# Patient Record
(demographics unavailable — no encounter records)

---

## 2024-10-28 NOTE — REASON FOR VISIT
[Initial Evaluation] : an initial evaluation [Hypothyroidism] : hypothyroidism [FreeTextEntry2] : Dr. Nakia Nice

## 2024-10-28 NOTE — CONSULT LETTER
[Dear  ___] : Dear  [unfilled], [Consult Letter:] : I had the pleasure of evaluating your patient, [unfilled]. [Please see my note below.] : Please see my note below. [Consult Closing:] : Thank you very much for allowing me to participate in the care of this patient.  If you have any questions, please do not hesitate to contact me. [Sincerely,] : Sincerely, [FreeTextEntry2] : Dr. Nakia Nice [address] [place] [address] [FreeTextEntry3] : Sae Merino DO Division of [facility]  of Medicine [employer] Director of [facility]

## 2024-10-28 NOTE — PHYSICAL EXAM
[Alert] : alert [Well Nourished] : well nourished [Healthy Appearance] : healthy appearance [No Acute Distress] : no acute distress [Well Developed] : well developed [EOMI] : extra ocular movement intact [Normal Hearing] : hearing was normal [Supple] : the neck was supple [Thyroid Not Enlarged] : the thyroid was not enlarged [No Respiratory Distress] : no respiratory distress [No Accessory Muscle Use] : no accessory muscle use [Normal Rate and Effort] : normal respiratory rate and effort [Clear to Auscultation] : lungs were clear to auscultation bilaterally [Normal S1, S2] : normal S1 and S2 [Normal Rate] : heart rate was normal [Regular Rhythm] : with a regular rhythm [No Edema] : no peripheral edema [Normal Bowel Sounds] : normal bowel sounds [Not Tender] : non-tender [Not Distended] : not distended [Soft] : abdomen soft [No Stigmata of Cushings Syndrome] : no stigmata of Cushings Syndrome [No Involuntary Movements] : no involuntary movements were seen [Oriented x3] : oriented to person, place, and time [Normal Affect] : the affect was normal [Normal Mood] : the mood was normal

## 2024-10-28 NOTE — REVIEW OF SYSTEMS
[Recent Weight Loss (___ Lbs)] : recent weight loss: [unfilled] lbs [Nausea] : nausea [Abdominal Pain] : abdominal pain [Heartburn] : heartburn [Diarrhea] : diarrhea [Gas/Bloating] : gas/bloating [Anxiety] : anxiety [All other systems negative] : All other systems negative [Fatigue] : no fatigue [Visual Field Defect] : no visual field defect [Dysphagia] : no dysphagia [Neck Pain] : no neck pain [Dysphonia] : no dysphonia [Chest Pain] : no chest pain [Palpitations] : no palpitations [Shortness Of Breath] : no shortness of breath [Vomiting] : no vomiting [Polyuria] : no polyuria [Irregular Menses] : regular menses [Headaches] : no headaches [Tremors] : no tremors [Depression] : no depression [Polydipsia] : no polydipsia [Cold Intolerance] : no cold intolerance [Heat Intolerance] : no heat intolerance [FreeTextEntry7] : with semaglutide

## 2024-10-28 NOTE — ASSESSMENT
[FreeTextEntry1] : 35 y/o F w/ hx of Hashimoto's diagnosed prior to 2nd pregnancy 4/2023 now on levothyroxine 75 mcg daily. Will assess TFTs once taking consistently not influenced by calcium from Tums. Will assess TPO Ab. Dose adjustment to be based on the results.    Prep time with review of labs and interval progress notes and consultations  Discussion with patient regarding thyroid management plan, risks and benefits, treatment options and goals of care answering all patients questions and addressing all concerns  Post-visit completion charting and review Total Time 45 min  Specifically causes, evaluation, treatment options, risks, complications, and benefits of available therapies were discussed. Questions were answered.  The submitted E/M billing level for this visit reflects the total time spent on the day of the visit including face-to-face time spent with the patient, non-face-to-face review of medical records and relevant information, documentation, and asynchronous communication with the patient after a visit via phone, email, or patients EHR portal after the visit.  The medical records reviewed are either scanned into the chart or reviewed with the patient using a patients electronic medical records portal for patients with records not available to [facility] via electronic transmission platforms from other institutions and labs.  Time spend counseling and performing coordination of care was also included in determining the appropriate EM billing level.  I have reviewed and verified information regarding the chief complaint and history recorded by the ancillary staff and/or the patient. I have independently reviewed and interpreted tests performed by other physicians and facilities as necessary.   I have discussed with the patient differential diagnosis, reason for auxiliary tests if ordered, risks, benefits, alternatives, and complications of each form of therapy were discussed.

## 2024-10-28 NOTE — ASSESSMENT
[FreeTextEntry1] : 33 y/o F w/ hx of Hashimoto's diagnosed prior to 2nd pregnancy 4/2023 now on levothyroxine 75 mcg daily. Will assess TFTs once taking consistently not influenced by calcium from Tums. Will assess TPO Ab. Dose adjustment to be based on the results.    Prep time with review of labs and interval progress notes and consultations  Discussion with patient regarding thyroid management plan, risks and benefits, treatment options and goals of care answering all patients questions and addressing all concerns  Post-visit completion charting and review Total Time 45 min  Specifically causes, evaluation, treatment options, risks, complications, and benefits of available therapies were discussed. Questions were answered.  The submitted E/M billing level for this visit reflects the total time spent on the day of the visit including face-to-face time spent with the patient, non-face-to-face review of medical records and relevant information, documentation, and asynchronous communication with the patient after a visit via phone, email, or patients EHR portal after the visit.  The medical records reviewed are either scanned into the chart or reviewed with the patient using a patients electronic medical records portal for patients with records not available to [facility] via electronic transmission platforms from other institutions and labs.  Time spend counseling and performing coordination of care was also included in determining the appropriate EM billing level.  I have reviewed and verified information regarding the chief complaint and history recorded by the ancillary staff and/or the patient. I have independently reviewed and interpreted tests performed by other physicians and facilities as necessary.   I have discussed with the patient differential diagnosis, reason for auxiliary tests if ordered, risks, benefits, alternatives, and complications of each form of therapy were discussed.

## 2024-10-28 NOTE — CONSULT LETTER
[Dear  ___] : Dear  [unfilled], [Consult Letter:] : I had the pleasure of evaluating your patient, [unfilled]. [Please see my note below.] : Please see my note below. [Consult Closing:] : Thank you very much for allowing me to participate in the care of this patient.  If you have any questions, please do not hesitate to contact me. [Sincerely,] : Sincerely, [FreeTextEntry2] : Dr. Nakia Nice [address] [place] [address] [FreeTextEntry3] : Sae Merino DO Division of [facility]in[facility]  of Medicine [facility] [employer] Director of [place] [facility]

## 2025-03-03 NOTE — PHYSICAL EXAM
[No Acute Distress] : no acute distress [Well Nourished] : well nourished [Well Developed] : well developed [Well-Appearing] : well-appearing [No Respiratory Distress] : no respiratory distress  [No Accessory Muscle Use] : no accessory muscle use [Normal Rate] : normal rate  [Regular Rhythm] : with a regular rhythm [No Rash] : no rash [Normal] : no rash [Normal Affect] : the affect was normal [Alert and Oriented x3] : oriented to person, place, and time [Normal Mood] : the mood was normal [Normal Insight/Judgement] : insight and judgment were intact
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
## 2025-03-21 NOTE — PHYSICAL EXAM
[Chaperone Present] : A chaperone was present in the examining room during all aspects of the physical examination [FreeTextEntry2] : Diane  [Appropriately responsive] : appropriately responsive [Alert] : alert [No Acute Distress] : no acute distress [Soft] : soft [Non-tender] : non-tender [Non-distended] : non-distended [Oriented x3] : oriented x3 [Examination Of The Breasts] : a normal appearance [No Masses] : no breast masses were palpable [Labia Majora] : normal [Labia Minora] : normal [Normal] : normal [Uterine Adnexae] : normal

## 2025-03-21 NOTE — HISTORY OF PRESENT ILLNESS
[TextBox_4] : 33yo presents for annual exam h/o Hashimotos  pt states her c/s scar feels sensitive with menses has 2 kids - last was c/s 2/26/24 - sometimes has a bit of sensitivity when she touches it. DIscomfort with mesnes feels superficial - not consistent - if lays on belly to sleep will feel the scar. Is improving.  Is not on birth control - is scared of IUD - would prefer OCP  [PapSmeardate] : 2024 No indicators present

## 2025-06-13 NOTE — HISTORY OF PRESENT ILLNESS
[Sudden] : sudden [6] : 6 [4] : 4 [Dull/Aching] : dull/aching [Sharp] : sharp [Frequent] : frequent [Household chores] : household chores [Rest] : rest [Exercising] : exercising [de-identified] : 6/13/25: Patient is here for right knee and leg pain. pain started after dancing in heels on 6/7/25 she started feeling the pain that starts on the back of the knee down the calf.  [] : Post Surgical Visit: no [FreeTextEntry1] : right knee  [FreeTextEntry7] : down the leg

## 2025-06-13 NOTE — ASSESSMENT
[FreeTextEntry1] : ___ We reviewed the findings and her options. [id number] QD x 7-10 days with GI precautions prescribed. PT for HEP planned. Will see Dr. Birch in 1-2 weeks for revaluation. May consider an MRI at that point.  Patient seen by Nina DE PAZ who determined the assessment and plan and participated in all aspects of the office encounter.

## 2025-06-13 NOTE — PHYSICAL EXAM
[Right] : right knee [NL (140)] : flexion 140 degrees [NL (0)] : extension 0 degrees [5___] : hamstring 5[unfilled]/5 [Negative] : negative Jes's [] : patient ambulates without assistive device [FreeTextEntry9] : -groin pain with log rolling of the right hip

## 2025-06-13 NOTE — REASON FOR VISIT
[FreeTextEntry2] : NEW PATIENT 6/13/25  This is a 34 year old F with right knee pain after feeling a pop a few days ago.  No history.  Pain is lateral.  There is soreness in the calf.  No instability.

## 2025-06-13 NOTE — IMAGING
[Right] : right knee [AP] : anteroposterior [Lateral] : lateral [Tehaleh] : skyline [AP Standing] : anteroposterior standing [There are no fractures, subluxations or dislocations. No significant abnormalities are seen] : There are no fractures, subluxations or dislocations. No significant abnormalities are seen

## 2025-07-28 NOTE — ASSESSMENT
[FreeTextEntry1] : 35 y/o F w/ hx of Hashimoto's diagnosed prior to 2nd pregnancy 4/2023 now on levothyroxine 75 mcg daily. Will assess TFTs with next lab work with TPO Ab. Dose adjustment to be based on the results.  For weight loss, has tried dietary and lifestyle modifications. Intolerant to injectable GLP-1. Can give trial of phentermine 15mg daily.    Prep time with review of labs and interval progress notes and consultations  Discussion with patient regarding thyroid and weight loss management plan, risks and benefits, treatment options and goals of care answering all patients questions and addressing all concerns  Post-visit completion charting and review Total Time 35 min  Specifically causes, evaluation, treatment options, risks, complications, and benefits of available therapies were discussed. Questions were answered.  The submitted E/M billing level for this visit reflects the total time spent on the day of the visit including face-to-face time spent with the patient, non-face-to-face review of medical records and relevant information, documentation, and asynchronous communication with the patient after a visit via phone, email, or patients EHR portal after the visit.  The medical records reviewed are either scanned into the chart or reviewed with the patient using a patients electronic medical records portal for patients with records not available to [facility] via electronic transmission platforms from other institutions and labs.  Time spend counseling and performing coordination of care was also included in determining the appropriate EM billing level.  I have reviewed and verified information regarding the chief complaint and history recorded by the ancillary staff and/or the patient. I have independently reviewed and interpreted tests performed by other physicians and facilities as necessary.   I have discussed with the patient differential diagnosis, reason for auxiliary tests if ordered, risks, benefits, alternatives, and complications of each form of therapy were discussed.

## 2025-07-28 NOTE — HISTORY OF PRESENT ILLNESS
[FreeTextEntry1] : 35 y/o F w/ Hashimoto's diagnosed during IUI eval with reproductive endocrine 4/2023. Started on 25 mcg of levothyroxine daily increased during pregnancy to 50 mcg. Delivered 2/2024. Had been on stable dose of 50 mcg until around 9/2024 when TSH was 6.3 with Free T4 of 0.9 seen by GI doctor at that time. Had been taking levothyroxine daily with adherence but sometimes with food. Also was taking with Tums and antacids due to GI intolerance to semaglutide which she is no longer taking.  Now on 75 mcg daily with adherence. Chemically euthyroid as of 3/2025. No reported family hx of thyroid disease. No hx of lithium or amiodarone use. No dysphagia, dysphonia or neck pain. No hx of neck radiation or surgery. No hx of immunotherapy.  Currently feels well other than concerned about weight gain and difficulty losing weight despite dietary and lifestyle modifications. Tried GLP-1 with severe GI intolerance. Not looking to restart.

## 2025-07-28 NOTE — ASSESSMENT
[FreeTextEntry1] : 35 y/o F w/ hx of Hashimoto's diagnosed prior to 2nd pregnancy 4/2023 now on levothyroxine 75 mcg daily. Will assess TFTs with next lab work with TPO Ab. Dose adjustment to be based on the results.  For weight loss, has tried dietary and lifestyle modifications. Intolerant to injectable GLP-1. Can give trial of phentermine 15mg daily.    Prep time with review of labs and interval progress notes and consultations  Discussion with patient regarding thyroid and weight loss management plan, risks and benefits, treatment options and goals of care answering all patients questions and addressing all concerns  Post-visit completion charting and review Total Time 35 min  Specifically causes, evaluation, treatment options, risks, complications, and benefits of available therapies were discussed. Questions were answered.  The submitted E/M billing level for this visit reflects the total time spent on the day of the visit including face-to-face time spent with the patient, non-face-to-face review of medical records and relevant information, documentation, and asynchronous communication with the patient after a visit via phone, email, or patients EHR portal after the visit.  The medical records reviewed are either scanned into the chart or reviewed with the patient using a patients electronic medical records portal for patients with records not available to [facility] via electronic transmission platforms from other institutions and labs.  Time spend counseling and performing coordination of care was also included in determining the appropriate EM billing level.  I have reviewed and verified information regarding the chief complaint and history recorded by the ancillary staff and/or the patient. I have independently reviewed and interpreted tests performed by other physicians and facilities as necessary.   I have discussed with the patient differential diagnosis, reason for auxiliary tests if ordered, risks, benefits, alternatives, and complications of each form of therapy were discussed.

## 2025-07-28 NOTE — REVIEW OF SYSTEMS
[Fatigue] : fatigue [Recent Weight Gain (___ Lbs)] : recent weight gain: [unfilled] lbs [Recent Weight Loss (___ Lbs)] : no recent weight loss [Visual Field Defect] : no visual field defect [Dysphagia] : no dysphagia [Neck Pain] : no neck pain [Dysphonia] : no dysphonia [Chest Pain] : no chest pain [Palpitations] : no palpitations [Shortness Of Breath] : no shortness of breath [Nausea] : nausea [Abdominal Pain] : abdominal pain [Heartburn] : heartburn [Vomiting] : no vomiting [Diarrhea] : diarrhea [Gas/Bloating] : gas/bloating [Polyuria] : no polyuria [Irregular Menses] : regular menses [Headaches] : no headaches [Tremors] : no tremors [Depression] : no depression [Anxiety] : anxiety [Polydipsia] : no polydipsia [Cold Intolerance] : no cold intolerance [Heat Intolerance] : no heat intolerance [All other systems negative] : All other systems negative [FreeTextEntry7] : with semaglutide